# Patient Record
Sex: MALE | URBAN - METROPOLITAN AREA
[De-identification: names, ages, dates, MRNs, and addresses within clinical notes are randomized per-mention and may not be internally consistent; named-entity substitution may affect disease eponyms.]

---

## 2017-03-31 ENCOUNTER — TRANSFERRED RECORDS (OUTPATIENT)
Dept: HEALTH INFORMATION MANAGEMENT | Facility: CLINIC | Age: 32
End: 2017-03-31

## 2017-03-31 ENCOUNTER — HOSPITAL ENCOUNTER (OUTPATIENT)
Facility: CLINIC | Age: 32
Setting detail: OBSERVATION
Discharge: HOME | End: 2017-04-01
Attending: INTERNAL MEDICINE | Admitting: INTERNAL MEDICINE
Payer: COMMERCIAL

## 2017-03-31 DIAGNOSIS — E87.6 HYPOKALEMIA: Primary | ICD-10-CM

## 2017-03-31 PROBLEM — F19.939 WITHDRAWAL SEIZURES (H): Status: ACTIVE | Noted: 2017-03-31

## 2017-03-31 PROBLEM — R56.9 WITHDRAWAL SEIZURES (H): Status: ACTIVE | Noted: 2017-03-31

## 2017-03-31 LAB
ALBUMIN SERPL-MCNC: 3.9 G/DL (ref 3.4–5)
ALP SERPL-CCNC: 70 U/L (ref 40–150)
ALT SERPL W P-5'-P-CCNC: 136 U/L (ref 0–70)
ANION GAP SERPL CALCULATED.3IONS-SCNC: 13 MMOL/L (ref 3–14)
AST SERPL W P-5'-P-CCNC: 83 U/L (ref 0–45)
BILIRUB SERPL-MCNC: 1.1 MG/DL (ref 0.2–1.3)
BUN SERPL-MCNC: 7 MG/DL (ref 7–30)
CALCIUM SERPL-MCNC: 8.8 MG/DL (ref 8.5–10.1)
CHLORIDE SERPL-SCNC: 98 MMOL/L (ref 94–109)
CO2 SERPL-SCNC: 25 MMOL/L (ref 20–32)
CREAT SERPL-MCNC: 0.75 MG/DL (ref 0.66–1.25)
GFR SERPL CREATININE-BSD FRML MDRD: ABNORMAL ML/MIN/1.7M2
GLUCOSE SERPL-MCNC: 92 MG/DL (ref 70–99)
POTASSIUM SERPL-SCNC: 3 MMOL/L (ref 3.4–5.3)
PROT SERPL-MCNC: 7.4 G/DL (ref 6.8–8.8)
SODIUM SERPL-SCNC: 136 MMOL/L (ref 133–144)

## 2017-03-31 PROCEDURE — 25000132 ZZH RX MED GY IP 250 OP 250 PS 637: Performed by: INTERNAL MEDICINE

## 2017-03-31 PROCEDURE — 36415 COLL VENOUS BLD VENIPUNCTURE: CPT | Performed by: INTERNAL MEDICINE

## 2017-03-31 PROCEDURE — G0378 HOSPITAL OBSERVATION PER HR: HCPCS

## 2017-03-31 PROCEDURE — 99219 ZZC INITIAL OBSERVATION CARE,LEVL II: CPT | Performed by: INTERNAL MEDICINE

## 2017-03-31 PROCEDURE — 80053 COMPREHEN METABOLIC PANEL: CPT | Performed by: INTERNAL MEDICINE

## 2017-03-31 RX ORDER — POTASSIUM CHLORIDE 29.8 MG/ML
20 INJECTION INTRAVENOUS
Status: DISCONTINUED | OUTPATIENT
Start: 2017-03-31 | End: 2017-04-01

## 2017-03-31 RX ORDER — CLONIDINE HYDROCHLORIDE 0.1 MG/1
0.1 TABLET ORAL 2 TIMES DAILY
Status: DISCONTINUED | OUTPATIENT
Start: 2017-03-31 | End: 2017-04-01

## 2017-03-31 RX ORDER — MULTIVITAMIN,THERAPEUTIC
1 TABLET ORAL DAILY PRN
COMMUNITY

## 2017-03-31 RX ORDER — MAGNESIUM SULFATE HEPTAHYDRATE 40 MG/ML
4 INJECTION, SOLUTION INTRAVENOUS EVERY 4 HOURS PRN
Status: DISCONTINUED | OUTPATIENT
Start: 2017-03-31 | End: 2017-04-01 | Stop reason: HOSPADM

## 2017-03-31 RX ORDER — PROCHLORPERAZINE 25 MG
25 SUPPOSITORY, RECTAL RECTAL EVERY 12 HOURS PRN
Status: DISCONTINUED | OUTPATIENT
Start: 2017-03-31 | End: 2017-04-01 | Stop reason: HOSPADM

## 2017-03-31 RX ORDER — POTASSIUM CHLORIDE 1.5 G/1.58G
20-40 POWDER, FOR SOLUTION ORAL
Status: DISCONTINUED | OUTPATIENT
Start: 2017-03-31 | End: 2017-04-01

## 2017-03-31 RX ORDER — ONDANSETRON 2 MG/ML
4 INJECTION INTRAMUSCULAR; INTRAVENOUS EVERY 6 HOURS PRN
Status: DISCONTINUED | OUTPATIENT
Start: 2017-03-31 | End: 2017-04-01 | Stop reason: HOSPADM

## 2017-03-31 RX ORDER — ACETAMINOPHEN 325 MG/1
650 TABLET ORAL EVERY 4 HOURS PRN
Status: DISCONTINUED | OUTPATIENT
Start: 2017-03-31 | End: 2017-04-01 | Stop reason: HOSPADM

## 2017-03-31 RX ORDER — FOLIC ACID 1 MG/1
1 TABLET ORAL DAILY
Status: DISCONTINUED | OUTPATIENT
Start: 2017-03-31 | End: 2017-04-01 | Stop reason: HOSPADM

## 2017-03-31 RX ORDER — IBUPROFEN 400 MG/1
400 TABLET, FILM COATED ORAL EVERY 6 HOURS PRN
Status: DISCONTINUED | OUTPATIENT
Start: 2017-03-31 | End: 2017-04-01 | Stop reason: HOSPADM

## 2017-03-31 RX ORDER — ONDANSETRON 4 MG/1
4 TABLET, ORALLY DISINTEGRATING ORAL EVERY 6 HOURS PRN
Status: DISCONTINUED | OUTPATIENT
Start: 2017-03-31 | End: 2017-04-01 | Stop reason: HOSPADM

## 2017-03-31 RX ORDER — BISACODYL 10 MG
10 SUPPOSITORY, RECTAL RECTAL DAILY PRN
Status: DISCONTINUED | OUTPATIENT
Start: 2017-03-31 | End: 2017-04-01 | Stop reason: HOSPADM

## 2017-03-31 RX ORDER — NITROGLYCERIN 0.4 MG/1
0.4 TABLET SUBLINGUAL EVERY 5 MIN PRN
Status: DISCONTINUED | OUTPATIENT
Start: 2017-03-31 | End: 2017-04-01 | Stop reason: HOSPADM

## 2017-03-31 RX ORDER — AMLODIPINE BESYLATE 10 MG/1
10 TABLET ORAL DAILY
Status: DISCONTINUED | OUTPATIENT
Start: 2017-04-01 | End: 2017-04-01 | Stop reason: HOSPADM

## 2017-03-31 RX ORDER — POTASSIUM CHLORIDE 1500 MG/1
20-40 TABLET, EXTENDED RELEASE ORAL
Status: DISCONTINUED | OUTPATIENT
Start: 2017-03-31 | End: 2017-04-01

## 2017-03-31 RX ORDER — LIDOCAINE 40 MG/G
CREAM TOPICAL
Status: DISCONTINUED | OUTPATIENT
Start: 2017-03-31 | End: 2017-04-01 | Stop reason: HOSPADM

## 2017-03-31 RX ORDER — SODIUM CHLORIDE 9 MG/ML
INJECTION, SOLUTION INTRAVENOUS CONTINUOUS
Status: DISCONTINUED | OUTPATIENT
Start: 2017-03-31 | End: 2017-04-01

## 2017-03-31 RX ORDER — TEMAZEPAM 7.5 MG/1
15 CAPSULE ORAL
Status: DISCONTINUED | OUTPATIENT
Start: 2017-03-31 | End: 2017-04-01 | Stop reason: HOSPADM

## 2017-03-31 RX ORDER — PROCHLORPERAZINE MALEATE 5 MG
5-10 TABLET ORAL EVERY 6 HOURS PRN
Status: DISCONTINUED | OUTPATIENT
Start: 2017-03-31 | End: 2017-04-01 | Stop reason: HOSPADM

## 2017-03-31 RX ORDER — NALOXONE HYDROCHLORIDE 0.4 MG/ML
.1-.4 INJECTION, SOLUTION INTRAMUSCULAR; INTRAVENOUS; SUBCUTANEOUS
Status: DISCONTINUED | OUTPATIENT
Start: 2017-03-31 | End: 2017-04-01 | Stop reason: HOSPADM

## 2017-03-31 RX ORDER — LANOLIN ALCOHOL/MO/W.PET/CERES
100 CREAM (GRAM) TOPICAL DAILY
Status: DISCONTINUED | OUTPATIENT
Start: 2017-04-01 | End: 2017-04-01 | Stop reason: HOSPADM

## 2017-03-31 RX ORDER — MULTIPLE VITAMINS W/ MINERALS TAB 9MG-400MCG
1 TAB ORAL DAILY
Status: DISCONTINUED | OUTPATIENT
Start: 2017-03-31 | End: 2017-04-01 | Stop reason: HOSPADM

## 2017-03-31 RX ORDER — LORAZEPAM 1 MG/1
1-2 TABLET ORAL EVERY 30 MIN PRN
Status: DISCONTINUED | OUTPATIENT
Start: 2017-03-31 | End: 2017-04-01 | Stop reason: HOSPADM

## 2017-03-31 RX ORDER — POTASSIUM CHLORIDE 7.45 MG/ML
10 INJECTION INTRAVENOUS
Status: DISCONTINUED | OUTPATIENT
Start: 2017-03-31 | End: 2017-04-01

## 2017-03-31 RX ORDER — AMOXICILLIN 250 MG
1-2 CAPSULE ORAL 2 TIMES DAILY PRN
Status: DISCONTINUED | OUTPATIENT
Start: 2017-03-31 | End: 2017-04-01 | Stop reason: HOSPADM

## 2017-03-31 RX ORDER — LORAZEPAM 2 MG/ML
1-2 INJECTION INTRAMUSCULAR EVERY 30 MIN PRN
Status: DISCONTINUED | OUTPATIENT
Start: 2017-03-31 | End: 2017-04-01 | Stop reason: HOSPADM

## 2017-03-31 RX ADMIN — CLONIDINE HYDROCHLORIDE 0.1 MG: 0.1 TABLET ORAL at 22:51

## 2017-03-31 RX ADMIN — POTASSIUM CHLORIDE 40 MEQ: 1500 TABLET, EXTENDED RELEASE ORAL at 22:51

## 2017-03-31 RX ADMIN — FOLIC ACID 1 MG: 1 TABLET ORAL at 22:50

## 2017-03-31 RX ADMIN — MULTIPLE VITAMINS W/ MINERALS TAB 1 TABLET: TAB at 22:51

## 2017-03-31 NOTE — IP AVS SNAPSHOT
MRN:[id number]                      After Visit Summary   3/31/2017    Buck Hunter    MRN: [id number]           Thank you!     Thank you for choosing [facility] for your care. Our goal is always to provide you with excellent care. Hearing back from our patients is one way we can continue to improve our services. Please take a few minutes to complete the written survey that you may receive in the mail after you visit. If you would like to speak to someone directly about your visit please contact Patient Relations at [phone]. Thank you!          Patient Information     Date Of Birth          1985        About your hospital stay     You were admitted on:  March 31, 2017 You last received care in the:  [place] [address] [facility]    You were discharged on:  April 1, 2017        Reason for your hospital stay       Seizure, suspected to be due to alcohol withdrawal                  Who to Call     For medical emergencies, please call 911.  For non-urgent questions about your medical care, please call your primary care provider or clinic, None          Attending Provider     Provider Specialty    Narinder Vang MD Internal Medicine       Primary Care Provider    Physician No Ref-Primary       No address on file        After Care Instructions     Activity       Your activity upon discharge: activity as tolerated, no driving until cleared by Neurology            Diet       Follow this diet upon discharge: Regular                  Follow-up Appointments     Follow-up and recommended labs and tests        Follow up with PCP at the [facility] in 1-2 weeks with basic metabolic panel.  Follow up with Neurology at the [facility] regarding seizures, next available.  Follow up with chemical dependency at the [facility] as planned.  Follow up with physical therapy for frozen shoulder as planned.   No driving until cleared by Neurology.                  Pending Results     No orders found for last  "3 day(s).            Statement of Approval     Ordered          17 1600  I have reviewed and agree with all the recommendations and orders detailed in this document.  EFFECTIVE NOW     Approved and electronically signed by:  Narinder Vang MD             Admission Information     Date & Time Provider Department Dept. Phone    3/31/2017 Narinder Vang MD [place] [address] [facility] [phone]      Your Vitals Were     Blood Pressure Pulse Temperature Respirations Height Weight    153/104 117 97  F (36.1  C) (Oral) 20 1.778 m (5' 10\") 106.2 kg (234 lb 1.6 oz)    Pulse Oximetry BMI (Body Mass Index)                95% 33.59 kg/m2          MyChart Information     [employer] lets you send messages to your doctor, view your test results, renew your prescriptions, schedule appointments and more. To sign up, go to [email or web address] . Click on \"Log in\" on the left side of the screen, which will take you to the Welcome page. Then click on \"Sign up Now\" on the right side of the page.     You will be asked to enter the access code listed below, as well as some personal information. Please follow the directions to create your username and password.     Your access code is: [email or web address]  Expires: 2017  5:27 PM     Your access code will  in 90 days. If you need help or a new code, please call your [place] clinic or [phone].        Care EveryWhere ID     This is your Care EveryWhere ID. This could be used by other organizations to access your [place] medical records  [id number]           Review of your medicines      START taking        Dose / Directions    potassium chloride SA 20 MEQ CR tablet   Commonly known as:  K-DUR/KLOR-CON M        Dose:  20 mEq   Take 1 tablet (20 mEq) by mouth daily   Quantity:  5 tablet   Refills:  0         CONTINUE these medicines which have NOT CHANGED        Dose / Directions    AMLODIPINE BESYLATE PO        Dose:  10 mg   Take 10 mg by mouth daily "   Refills:  0       MAGNESIUM OXIDE PO        Dose:  400 mg   Take 400 mg by mouth daily   Refills:  0       multivitamin, therapeutic Tabs tablet        Dose:  1 tablet   Take 1 tablet by mouth daily as needed   Refills:  0       VITAMIN B-1 PO        Dose:  100 mg   Take 100 mg by mouth daily   Refills:  0            Where to get your medicines      These medications were sent to [id number]  [place], [place] - [address]  [address], [place] [address]     Phone:  [phone]     potassium chloride SA 20 MEQ CR tablet                Protect others around you: Learn how to safely use, store and throw away your medicines at www.disposemymeds.org.             Medication List: This is a list of all your medications and when to take them. Check marks below indicate your daily home schedule. Keep this list as a reference.      Medications           Morning Afternoon Evening Bedtime As Needed    AMLODIPINE BESYLATE PO   Take 10 mg by mouth daily   Last time this was given:  10 mg on 4/1/2017  8:37 AM                                   MAGNESIUM OXIDE PO   Take 400 mg by mouth daily                                   multivitamin, therapeutic Tabs tablet   Take 1 tablet by mouth daily as needed                                   potassium chloride SA 20 MEQ CR tablet   Commonly known as:  K-DUR/KLOR-CON M   Take 1 tablet (20 mEq) by mouth daily   Last time this was given:  40 mEq on 3/31/2017 10:51 PM                                   VITAMIN B-1 PO   Take 100 mg by mouth daily   Last time this was given:  100 mg on 4/1/2017  8:37 AM                                             More Information        Alcohol Addiction  Are you addicted to alcohol?    You may be addicted to alcohol if your drinking harms yourself or others or leads to other problems with your daily life.  The medical term for this is alcohol use disorder. Your health care provider may diagnose you with this  disorder if you have at least two of the following problems within the span of a year:    You drink alcohol in larger amounts or for a longer period than you intended.    You frequently want to cut down or control alcohol use, or have frequently failed efforts to do so.    You spend a lot of time getting alcohol, using alcohol, or recovering from alcohol use.    You crave or have a strong desire or urge to drink alcohol.    Ongoing alcohol use makes it hard for you to be responsible at work, school, or home.    You continue to use alcohol even though you have had problems in relationships or social settings because of it.    You give up or miss important social, work, or recreational activities because of alcohol use.    You drink alcohol in situations in which it is physically unsafe, such as driving while intoxicated.    You continue to drink alcohol despite knowing it has caused physical or emotional problems.    You need more and more alcohol to get the same effects.    You have withdrawal symptoms or use alcohol to avoid withdrawal symptoms.    6631-1528 [employer]. [address] [place], [place] [address]. All rights reserved. This information is not intended as a substitute for professional medical care. Always follow your healthcare professional's instructions.                Alcohol Withdrawal Seizure  Some seizures are caused by alcohol withdrawal. Alcohol withdrawal usually begins after prolonged or heavy drinking for a number of days, and then you suddenly stop drinking, or cut down on your alcohol use. Seizures can also be directly caused by alcohol, even without withdrawal. Seizures may occur as soon as a few hours after your last drink or 1 to 2 days later.  If you have a history of seizures from any cause, you are more likely to have a seizure with alcohol abuse. Standard seizure medicines may not prevent alcohol withdrawal seizures.  Delirium Tremens or DTs  When you have a seizure  because of alcohol, you are more likely to develop DTs. DTs are the worst stage of the alcohol withdrawal syndrome. If it happens, it usually begins about 3 to 5 days after your last drink. It is potentially life-threatening.  Symptoms of DTs include:    Sudden and severe mental or nervous system changes    Uncontrollable tremors    Severe disorientation, confusion, hallucinations    Heart racing, or irregular heartbeat    Hypertension    Seizures    Coma and death  Home care  The following can help you care for yourself at home:    You will need plenty of rest and fluids over the next several days. Eat regular meals. Do not drink any more alcohol. During this time, it is best that you stay with family or friends who can help and support you. You can also admit yourself to an outpatient, inpatient, or residential detox program.    Don't drive until all symptoms are gone and you are feeling better. You should also not drive until you have been checked by your doctor. The doctor will need to rule out an actual seizure disorder.    If you were given sedative medicine to help your symptoms, don't take it more often than prescribed and never take it with alcohol.    Heavy regular drinking combined with poor nutrition can lead to a thiamine deficiency and cause permanent brain damage. It is important that you take daily vitamins.  Follow-up care  Once you have gone through the withdrawal symptoms, you have fought half of the baird. To avoid the risk of returning to your previous drinking pattern, you should get follow-up support and treatment. These resources can help you:    Alcoholics Anonymous offers support through a self-help fellowship www.aa.org    [place] offers support to families of alcohol users 731-825-4817 www.al-anon.org    National [place] on Alcoholism and Drug Dependence 409-110-2289 www.ncadd.org    Search the Internet or check your phonebook for Drug Abuse & Treatment Centers.  Call 911  Call 911 if  any of these occur:    Another seizure    Trouble breathing or slow, irregular breathing    Chest pain    Sudden weakness on one side of your body or sudden trouble speaking    Heavy bleeding or vomiting blood    Very drowsy or trouble awakening    Fainting or loss of consciousness    Rapid heart rate  When to seek medical care  Get prompt medical attention or contact your doctor if you have any of the following:    Severe shakiness    Hallucinations    Fever of 100.4 F (38.0 C) or higher    Headache, confusion    Pain in your upper abdomen that gets worse    Repeated vomiting    3926-1858 [employer]. [address] [place] [address]. All rights reserved. This information is not intended as a substitute for professional medical care. Always follow your healthcare professional's instructions.                Hypokalemia  Hypokalemia means a low level of potassium in the blood. This most often occurs in patients who take diuretics (water pills). It can also occur due to severe vomiting or diarrhea.  It is also seen in people who take laxatives for long periods of time.  A mild case usually causes no symptoms. It is only found with blood testing. More severe potassium loss causes generalized weakness, muscle or abdominal cramping, heart palpitations (rapid or irregular heartbeats) and low blood pressure.  Home care    Take any potassium supplements prescribed.    Eat foods rich in potassium. The highest amount is found in artichoke, baked potatoes, spinach, cantaloupe, honeydew melon, cod, halibut, salmon, and scallops. White, red, or damon beans are also very good sources. A modest amount is found in orange juice, bananas, carrots, and tomato juice.    If you take certain types of diuretics, you will also need to take potassium supplements. If take a diuretic, discuss potassium supplements with your doctor.  Follow-up care  Follow up with your healthcare provider for a repeat blood test within  the next week or as advised by our staff.  When to seek medical advice  Call your healthcare provider if any of the following occur:    Increased weakness, fatigue, muscle cramps    Dizziness  Call 911  Call 911 if any of the following occur:    Irregular heartbeat, extra beats or very fast heart rate    Loss of consciousness    2853-1019 [employer]. [address] [place] [address]. All rights reserved. This information is not intended as a substitute for professional medical care. Always follow your healthcare professional's instructions.

## 2017-03-31 NOTE — IP AVS SNAPSHOT
[place] [address] [facility]    [address]    [facility] [address]    Phone:  [phone]    Fax:  [phone]                                       After Visit Summary   3/31/2017    Buck Hunter    MRN: [id number]           After Visit Summary Signature Page     I have received my discharge instructions, and my questions have been answered. I have discussed any challenges I see with this plan with the nurse or doctor.    ..........................................................................................................................................  Patient/Patient Representative Signature      ..........................................................................................................................................  Patient Representative Print Name and Relationship to Patient    ..................................................               ................................................  Date                                            Time    ..........................................................................................................................................  Reviewed by Signature/Title    ...................................................              ..............................................  Date                                                            Time

## 2017-04-01 VITALS
DIASTOLIC BLOOD PRESSURE: 104 MMHG | RESPIRATION RATE: 20 BRPM | HEIGHT: 70 IN | SYSTOLIC BLOOD PRESSURE: 153 MMHG | BODY MASS INDEX: 33.52 KG/M2 | WEIGHT: 234.1 LBS | TEMPERATURE: 97 F | OXYGEN SATURATION: 95 % | HEART RATE: 117 BPM

## 2017-04-01 PROBLEM — E87.6 HYPOKALEMIA: Status: ACTIVE | Noted: 2017-04-01

## 2017-04-01 LAB
ANION GAP SERPL CALCULATED.3IONS-SCNC: 11 MMOL/L (ref 3–14)
BUN SERPL-MCNC: 8 MG/DL (ref 7–30)
CALCIUM SERPL-MCNC: 8.4 MG/DL (ref 8.5–10.1)
CHLORIDE SERPL-SCNC: 101 MMOL/L (ref 94–109)
CO2 SERPL-SCNC: 24 MMOL/L (ref 20–32)
CREAT SERPL-MCNC: 0.74 MG/DL (ref 0.66–1.25)
GFR SERPL CREATININE-BSD FRML MDRD: ABNORMAL ML/MIN/1.7M2
GLUCOSE SERPL-MCNC: 84 MG/DL (ref 70–99)
POTASSIUM SERPL-SCNC: 3.1 MMOL/L (ref 3.4–5.3)
POTASSIUM SERPL-SCNC: 4.1 MMOL/L (ref 3.4–5.3)
SODIUM SERPL-SCNC: 136 MMOL/L (ref 133–144)

## 2017-04-01 PROCEDURE — 80048 BASIC METABOLIC PNL TOTAL CA: CPT | Performed by: INTERNAL MEDICINE

## 2017-04-01 PROCEDURE — 25000132 ZZH RX MED GY IP 250 OP 250 PS 637: Performed by: INTERNAL MEDICINE

## 2017-04-01 PROCEDURE — 25000128 H RX IP 250 OP 636: Performed by: INTERNAL MEDICINE

## 2017-04-01 PROCEDURE — 96360 HYDRATION IV INFUSION INIT: CPT

## 2017-04-01 PROCEDURE — 99217 ZZC OBSERVATION CARE DISCHARGE: CPT | Performed by: INTERNAL MEDICINE

## 2017-04-01 PROCEDURE — 84132 ASSAY OF SERUM POTASSIUM: CPT | Performed by: INTERNAL MEDICINE

## 2017-04-01 PROCEDURE — 36415 COLL VENOUS BLD VENIPUNCTURE: CPT | Performed by: INTERNAL MEDICINE

## 2017-04-01 PROCEDURE — 96361 HYDRATE IV INFUSION ADD-ON: CPT

## 2017-04-01 PROCEDURE — G0378 HOSPITAL OBSERVATION PER HR: HCPCS

## 2017-04-01 RX ORDER — POTASSIUM CHLORIDE 1500 MG/1
20-40 TABLET, EXTENDED RELEASE ORAL
Status: DISCONTINUED | OUTPATIENT
Start: 2017-04-01 | End: 2017-04-01 | Stop reason: HOSPADM

## 2017-04-01 RX ORDER — POTASSIUM CHLORIDE 1500 MG/1
20 TABLET, EXTENDED RELEASE ORAL DAILY
Qty: 5 TABLET | Refills: 0 | Status: SHIPPED | OUTPATIENT
Start: 2017-04-01 | End: 2017-04-01

## 2017-04-01 RX ORDER — POTASSIUM CHLORIDE 7.45 MG/ML
10 INJECTION INTRAVENOUS
Status: DISCONTINUED | OUTPATIENT
Start: 2017-04-01 | End: 2017-04-01 | Stop reason: HOSPADM

## 2017-04-01 RX ORDER — POTASSIUM CHLORIDE 1.5 G/1.58G
20-40 POWDER, FOR SOLUTION ORAL
Status: DISCONTINUED | OUTPATIENT
Start: 2017-04-01 | End: 2017-04-01 | Stop reason: HOSPADM

## 2017-04-01 RX ORDER — POTASSIUM CHLORIDE 1500 MG/1
20 TABLET, EXTENDED RELEASE ORAL DAILY
Qty: 5 TABLET | Refills: 0 | Status: SHIPPED | OUTPATIENT
Start: 2017-04-01

## 2017-04-01 RX ORDER — POTASSIUM CHLORIDE 29.8 MG/ML
20 INJECTION INTRAVENOUS
Status: DISCONTINUED | OUTPATIENT
Start: 2017-04-01 | End: 2017-04-01 | Stop reason: HOSPADM

## 2017-04-01 RX ADMIN — AMLODIPINE BESYLATE 10 MG: 10 TABLET ORAL at 08:37

## 2017-04-01 RX ADMIN — MULTIPLE VITAMINS W/ MINERALS TAB 1 TABLET: TAB at 08:37

## 2017-04-01 RX ADMIN — POTASSIUM CHLORIDE 20 MEQ: 1.5 POWDER, FOR SOLUTION ORAL at 14:30

## 2017-04-01 RX ADMIN — Medication 100 MG: at 08:37

## 2017-04-01 RX ADMIN — FOLIC ACID 1 MG: 1 TABLET ORAL at 08:37

## 2017-04-01 RX ADMIN — POTASSIUM CHLORIDE 40 MEQ: 1.5 POWDER, FOR SOLUTION ORAL at 12:16

## 2017-04-01 RX ADMIN — LORAZEPAM 1 MG: 1 TABLET ORAL at 05:54

## 2017-04-01 RX ADMIN — SODIUM CHLORIDE: 9 INJECTION, SOLUTION INTRAVENOUS at 01:06

## 2017-04-01 NOTE — PLAN OF CARE
Problem: Goal Outcome Summary  Goal: Goal Outcome Summary  Outcome: Improving  VSS. A&OX4. L/S: clear. CIWA : 3/3. Possible D/C today with no further symptoms of withdrawal.

## 2017-04-01 NOTE — PLAN OF CARE
Problem: Acute Alcohol Withdrawal Syndrome, Risk For/Actual (Adult)  Goal: Signs and Symptoms of Listed Potential Problems Will be Absent or Manageable (Acute Alcohol Withdrawal Syndrome, Risk For/Actual)  Signs and symptoms of listed potential problems will be absent or manageable by discharge/transition of care (reference Acute Alcohol Withdrawal Syndrome, Risk For/Actual (Adult) CPG).   Outcome: Adequate for Discharge Date Met:  04/01/17  Vss. K 4.1. Tele sr. Denies pain. Slight tremors. Denies any other symptoms. No sz noted. D/c inst reviewed tolerated po. Scripts given. Showered. A&OX4. Steady

## 2017-04-01 NOTE — PLAN OF CARE
Problem: Acute Alcohol Withdrawal Syndrome, Risk For/Actual (Adult)  Goal: Signs and Symptoms of Listed Potential Problems Will be Absent or Manageable (Acute Alcohol Withdrawal Syndrome, Risk For/Actual)  Signs and symptoms of listed potential problems will be absent or manageable by discharge/transition of care (reference Acute Alcohol Withdrawal Syndrome, Risk For/Actual (Adult) CPG).  Outcome: Improving  Direct admit from [place] with sz related to alcohol withdrawal. Last drink was yesterday. Pt drinks 0.5liter of hard liquor a day. sz happened in a liquor store. Lives with girlfriend. A&Ox4 upon arrival. bananna bag infusing. Slight tremulous and slight diaphoretic.. Denies any other symptoms. sz pads are on. Did have 1mg ativan at in [place] and metoprolol for tachy HR . Tolerated po intake. Tele

## 2017-04-01 NOTE — DISCHARGE SUMMARY
"Discharge Summary    Buck Hunter MRN# [id number]   YOB: 1985 Age: 31 year old     Date of Admission:  3/31/2017  Date of Discharge:  4/1/2017  Admitting Physician:  Narinder Vang MD  Discharge Physician:  Narinder Vang MD  Discharging Service:  Hospitalist     Home clinic: [facility], [place], [place]         Discharge Diagnosis:   1. Seizure, likely related to acute alcohol withdrawal. Recurrent.  Neurology evaluation recommended to evaluate for other potential causes of seizures (patient declined eval while in the hospital)    2.  Hypokalemia.      3. Alcohol dependence.     4. Hypertension.              Discharge Disposition:   Discharged to home           Allergies:   No Known Allergies             Condition on Discharge:   Discharge condition: Stable   Discharge vitals: Blood pressure 138/86, pulse 82, temperature 97.1  F (36.2  C), temperature source Oral, resp. rate 18, height 1.778 m (5' 10\"), weight 106.2 kg (234 lb 1.6 oz), SpO2 99 %.   Code status on discharge: Full Code     Physical exam on day of discharge:   GENERAL:  Comfortable. Cooperative.  PSYCH: pleasant, oriented, No acute distress.  EYES: PERRLA, Normal conjunctiva.  HEART:  Regular rate and rhythm. No JVD. Pulses normal. No edema.  LUNGS:  Clear to auscultation, normal Respiratory effort.  ABDOMEN:  Soft, no hepatosplenomegaly, normal bowel sounds.  EXTREMETIES: No clubbing, cyanosis or ischemia  SKIN:  Dry to touch, No rash.         History of Present Illness and Hospital Course:     See detailed admission note for full details.  Buck Hunter is a 31-year-old male with history of alcohol dependence, hypertension, post-traumatic stress disorder, and recent seizures thought to be related to alcohol abuse and withdrawal. He had a witnessed seizure at a liquor store in [place], [place] on the morning of presentation. He had not had a drink for 3 days. He was taken to the emergency " department in [facility] for evaluation. He had unremarkable workup there including CBC, blood alcohol level, urinalysis, urine toxicology screen, and CT of head. They wanted to admit him for observation for suspected alcohol withdrawal seizure, but did not have any beds. They contacted our service for direct admission and we accepted. Buck was transferred here and admitted under observation status.  He did not have significant withdrawal.  He had no further seizures.  Basic metabolic panel was obtained here and did not show hyponatremia (potential cause of seizure) but did show hypokalemia.  This was replaced.  Buck has had 6 similar seizures in the last 6 months.  These have all been thought to be due to alcohol abuse and withdrawal, but he has not had Neurology evaluation for other causes of seizures with MRI or EEG.  I offered MRI of brain and Neurology consult while here, but [facility].  This is reasonable.  I have advised him not to drive until cleared by Neurology. Buck seems stable at this time. He will discharge home later today with potassium supplement daily for the next 5 days, primary care follow up, chemical dependency follow up, Neurology follow up, and physical therapy follow up (for frozen shoulder).               Procedures / Imaging:   None here- CT of head was performed in the [facility] ED prior to transfer and was unremarkable.            Consultations:   No consultations were requested during this admission             Pending Results:   None           Discharge Instructions and Follow-Up:   Discharge diet: Regular   Discharge activity: Activity as tolerated. No driving until cleared by Neurology.    Discharge follow-up: Follow up with primary care provider in 1-2 weeks with BMP.  Follow up with chemical dependency at the [facility] as planned.  Follow up with Neurology at [facility] next available.  Follow up with PT for  frozen shoulder as planned.    Outpatient therapy: PT for frozen shoulder as previously planned.     Other instructions: Encouraged to get treatment to stop drinking.              Discharge Medications:   Current Discharge Medication List      START taking these medications    Details   potassium chloride SA (K-DUR/KLOR-CON M) 20 MEQ CR tablet Take 1 tablet (20 mEq) by mouth daily for 5 days  Qty: 5 tablet, Refills: 0    Associated Diagnoses: Hypokalemia         CONTINUE these medications which have NOT CHANGED    Details   AMLODIPINE BESYLATE PO Take 10 mg by mouth daily      Thiamine HCl (VITAMIN B-1 PO) Take 100 mg by mouth daily      MAGNESIUM OXIDE PO Take 400 mg by mouth daily      multivitamin, therapeutic (THERA-VIT) TABS tablet Take 1 tablet by mouth daily as needed                Total time spent in face to face contact with the patient and coordinating discharge was:  35 Minutes

## 2017-04-01 NOTE — PLAN OF CARE
Problem: Acute Alcohol Withdrawal Syndrome, Risk For/Actual (Adult)  Goal: Signs and Symptoms of Listed Potential Problems Will be Absent or Manageable (Acute Alcohol Withdrawal Syndrome, Risk For/Actual)  Signs and symptoms of listed potential problems will be absent or manageable by discharge/transition of care (reference Acute Alcohol Withdrawal Syndrome, Risk For/Actual (Adult) CPG).    PRIMARY DIAGNOSIS: ETOH withdrawal   Primary symptoms: diaphoretic, mildly tremulous      OUTPATIENT/OBSERVATION GOALS TO BE MET BEFORE DISCHARGE:     1. Orthostatic symptoms (BP decrease or HR increase with patient upright)?  NA  2. Vital Signs stable? YES   3. Documented urine output: yes  4. Tolerating PO fluid: yes  5. Symptoms improved: no  6. Labs WNL? No- K replacement needed  7. ADLs back to baseline?  yes  8. Activity and level of assistance: SBA  9. Pain status: denies pain  10. Barriers to discharge noted no      Interpretation of rhythm per telemetry tech:     04/01/17 0546   Acute Alcohol Withdrawal Syndrome, Risk For/Actual   Problems Assessed (Alcohol Withdrawal Syndrome) all   Problems Present (Alcohol Withdrawal Syndrome) effects of JEFFREY (alcohol withdrawal syndrome) including DTs (delirium tremens)

## 2017-04-01 NOTE — PROGRESS NOTES
"SPIRITUAL HEALTH SERVICES  SPIRITUAL ASSESSMENT Progress Note  [facility]    PRIMARY FOCUS:     Goals of care    Emotional/spiritual/Yazidism distress    Support for coping    ILLNESS CIRCUMSTANCES:   Reviewed documentation. Reflective conversation shared with Buck which integrated elements of illness and family narratives.     Context of Serious Illness/Symptom(s) - Buck is dealing with ETOH withdrawl    Persons/Resources Involved - Girlfriend and parents    DISTRESS:     Emotional/Existential/Relational Distress - Buck stated that he started drinking heavy after he returned from [place] about 6 years ago. He stated that he doesn't want to continue this path and has an upcoming appointment at the [facility] to get some help. He states he cares about his girlfriend and she told him if this happens again she will be gone.    Spiritual/Denominational Distress - Buck says he does not really have a God connection right now.    Social/Cultural/Economic Distress - None directly expressed.    SPIRIT (Coping):     Buddhism/Maricruz - None at this time    Spiritual Practice(s) - None at this time    Emotional/Existential/Relational Connections - Girlfriend, very supportive parents and \"buddies\"    SENSE-MAKING:    Goals of Care - [facility]     Meaning/Sense-Making - Buck stated that he cares a lot about his girlfriend and doesn't want to lose her through this. He stated that he has very loving and supportive parents and some good \"buddies.\" He wants to get help and has a plan as stated above.    PLAN: Spiritual health services remain available to [place] [facility].      Marian Bhakta   Intern  Pager [phone]    "

## 2017-04-01 NOTE — PROGRESS NOTES
CM: Met with patient, pt states he has [facility] Benefits.  He has a MD that he sees in [place].  Obs letter given, patient had no questions at this time.      Cassidy Batista RN, BSN, PHN, CTS  Care Transitions Specialist  [facility]  [phone]

## 2017-04-01 NOTE — H&P
[facility]  History and Physical   Hospitalist Service    Narinder Vang MD    Buck Hunter MRN# [id number]   YOB: 1985 Age: 31 year old      Date of Admission:  3/31/2017           Assessment and Plan:   Patient is a 31-year-old male with history of alcohol dependence, hypertension, posttraumatic stress disorder, and recent seizures thought to be related to alcohol abuse and withdrawal.  He had a witnessed seizure at a liquor store in [place], [place] this morning. He had not had a drink for 3 days.  He was taken to the emergency department in [facility].  He had unremarkable workup including CBC, blood alcohol level, urinalysis,  Urine toxicology screen, and CT of head.  They wanted to admit him for observation for possible alcohol withdrawal  And possible recurrent seizures.  They did not have any beds.  They contacted our service for direct admission and we accepted.    Problem list:    1.  Seizure, likely related to acute alcohol withdrawal.  He drinks heavily.  He has alcohol dependence.  He had not had a drink for 3 days.  He is not having many signs of acute alcohol withdrawal at this time he did receive some Ativan in the emergency department in [facility] prior to transfer here.  I suspect that these are alcohol withdrawal seizures.  He has had enough of them, however, that neurologic evaluation is warranted.  I recommended that we obtain MRI of his brain here as well as neurology consultation.  I also suggested that he would likely need EEG.  He declines neurology workup at this time. He prefers to pursue this at the [facility] where he receives most of his care.  He will be watched for recurrent seizure and for signs of more significant acute alcohol withdrawal. Acute alcohol withdrawal protocol will be ordered.  Seizure precautions will be ordered. I will check a compressive metabolic panel as electrolytes were not checked at the outside emergency department.  He'll be admitted under observation status.   He should not drive until cleared by a neurologist.    2.  Alcohol dependence.    3.   Hypertension.  Continue amlodipine.  Clonidine will be available for use as needed.    Full code  Ambulate for DVT prophylaxis  Disposition.  Admit under observation status           Code Status:   Full Code         Primary Care Physician:   No Ref-Primary, Physician None         Chief Complaint:   Seizure at liquor store    History is obtained from emergency department provider, Eugene, and the medical record         History of Present Illness:   Buck Hunter is a 31-year-old male with history of alcohol dependence, hypertension, post-traumatic stress disorder, and recent seizures thought to be related to alcohol abuse and withdrawal.  He had a witnessed seizure at a liquor store in [place], [place] this morning. He had not had a drink for 3 days. He normally drinks 1-3 pints of vodka most days.  He reportedly has had several evaluations for alcohol-related problems in [facility].  He states that he has had 6 seizures in the last 6 months that have occurred either while drinking or shortly after stopping drinking.  He has not had neurology evaluation for seizures. He was taken to the emergency department in [facility] after this episode this morning.  He had unremarkable workup including CBC, blood alcohol level, urinalysis,  Urine toxicology screen, and CT of head.  They wanted to admit him for observation for possible alcohol withdrawal  And possible recurrent seizures.  They did not have any beds.  They contacted our service for direct admission and we accepted.           Past Medical History:     Hypertension  Alcohol abuse and dependence  Acute alcohol withdrawal  Alcohol withdrawal seizures  Posttraumatic stress disorder          Past Surgical History:   No past surgical history on file.         Home Medications:     Prior to Admission medications    Medication Sig Last Dose  "Taking? Auth Provider   AMLODIPINE BESYLATE PO Take 10 mg by mouth daily 3/31/2017 at a.m Yes Unknown, Entered By History   Thiamine HCl (VITAMIN B-1 PO) Take 100 mg by mouth daily 3/31/2017 at a.m Yes Unknown, Entered By History   MAGNESIUM OXIDE PO Take 400 mg by mouth daily 3/31/2017 at a.m Yes Unknown, Entered By History   multivitamin, therapeutic (THERA-VIT) TABS tablet Take 1 tablet by mouth daily as needed 3/30/2017 at Unknown time Yes Unknown, Entered By History            Allergies:   No Known Allergies         Social History:   Patient is single but has a significant other  He does not smoke but does chew tobacco  He does abuse alcohol  He was in the  for 9 years after high school          Family History:   Patient denies family history of medical problems         Review of Systems:   The 10 point Review of Systems is negative other than as noted in the HPI.           Physical Exam:   Blood pressure (!) 145/102, pulse 120, temperature 99.2  F (37.3  C), temperature source Oral, resp. rate 16, height 1.778 m (5' 10\"), weight 106.2 kg (234 lb 1.6 oz), SpO2 97 %.  234 lbs 1.6 oz      GENERAL: Pleasant and cooperative. No acute distress.  EYES: Pupils equal and round. No scleral erythema or icterus.  ENT: External ears are normal without deformity. Posterior oropharynx is without erythem, swelling, or exudate.  NECK: Supple. No masses or swelling. No tenderness. Thyroid is normal without mass or tenderness.  CHEST: Clear to auscultation. Normal breath sounds. No retractions.   CV: Regular rate and rhythm. No JVD. Pulses normal.  ABDOMEN: Bowel sounds present. No tenderness. No masses or hernia.  EXTREMETIES: No clubbing, cyanosis, or ischemia.  SKIN: Warm and dry to touch. No wounds or rashes.  NEUROLOGIC: Strength and sensation are normal. Deep tendon reflexes are normal. Cranial nerves are normal.             Data:   All new lab and imaging data was reviewed.     Labs from outside emergency " department are reviewed.  CBC was unremarkable.  Blood alcohol level was less than 0.01.  Urinalysis was unremarkable.  Urine toxicology screen was negative except for benzodiazepines which were given in the emergency department.

## 2017-04-01 NOTE — PHARMACY-ADMISSION MEDICATION HISTORY
Admission medication history interview status for this patient is complete. See [facility] admission navigator for allergy information, prior to admission medications and immunization status.     Medication history interview source(s):Patient  Medication history resources (including written lists, pill bottles, clinic record):[facility] list  Primary pharmacy:    Changes made to [facility] medication list:  Added: all the medications per his phone conversation with family at home.  Deleted: none  Changed: none    Actions taken by pharmacist (provider contacted, etc):As above     Additional medication history information:None    Medication reconciliation/reorder completed by provider prior to medication history? No    For patients on insulin therapy: NO   Lantus/levemir/NPH/Mix 70/30 dose: _____ in AM/PM or twice daily   Sliding scale Novolog Y/N   If Yes, do you have a baseline novolog pre-meal dose: ______units with meals   Patients eat three meals a day: Y/N   Any Barriers to therapy: cost of medications/comfortable with giving injections (if applicable)/ comfortable and confident with current diabetes regimen     Prior to Admission medications    Medication Sig Last Dose Taking? Auth Provider   AMLODIPINE BESYLATE PO Take 10 mg by mouth daily 3/31/2017 at a.m Yes Unknown, Entered By History   Thiamine HCl (VITAMIN B-1 PO) Take 100 mg by mouth daily 3/31/2017 at a.m Yes Unknown, Entered By History   MAGNESIUM OXIDE PO Take 400 mg by mouth daily 3/31/2017 at a.m Yes Unknown, Entered By History   multivitamin, therapeutic (THERA-VIT) TABS tablet Take 1 tablet by mouth daily as needed 3/30/2017 at Unknown time Yes Unknown, Entered By History     Of note, he mentioned that he is taking a heart rate medication, but declined using it when the family member could not find one at home.